# Patient Record
Sex: FEMALE | Race: BLACK OR AFRICAN AMERICAN | NOT HISPANIC OR LATINO | URBAN - METROPOLITAN AREA
[De-identification: names, ages, dates, MRNs, and addresses within clinical notes are randomized per-mention and may not be internally consistent; named-entity substitution may affect disease eponyms.]

---

## 2020-08-25 ENCOUNTER — OFFICE VISIT (OUTPATIENT)
Dept: URBAN - METROPOLITAN AREA CLINIC 60 | Facility: CLINIC | Age: 50
End: 2020-08-25

## 2020-10-27 ENCOUNTER — OFFICE VISIT (OUTPATIENT)
Dept: URBAN - METROPOLITAN AREA CLINIC 121 | Facility: CLINIC | Age: 50
End: 2020-10-27

## 2020-11-05 ENCOUNTER — OFFICE VISIT (OUTPATIENT)
Dept: URBAN - METROPOLITAN AREA CLINIC 60 | Facility: CLINIC | Age: 50
End: 2020-11-05

## 2022-01-13 ENCOUNTER — OFFICE VISIT (OUTPATIENT)
Dept: URBAN - METROPOLITAN AREA CLINIC 63 | Facility: CLINIC | Age: 52
End: 2022-01-13

## 2022-02-08 ENCOUNTER — RX ONLY (OUTPATIENT)
Age: 52
Setting detail: RX ONLY
End: 2022-02-08

## 2022-02-08 RX ORDER — HYDROCORTISONE 25 MG/G
1 CREAM TOPICAL BID
Qty: 20 | Refills: 0 | Status: ERX | COMMUNITY
Start: 2022-02-08

## 2022-03-10 ENCOUNTER — APPOINTMENT (RX ONLY)
Dept: URBAN - METROPOLITAN AREA CLINIC 333 | Facility: CLINIC | Age: 52
Setting detail: DERMATOLOGY
End: 2022-03-10

## 2022-03-10 DIAGNOSIS — Z41.9 ENCOUNTER FOR PROCEDURE FOR PURPOSES OTHER THAN REMEDYING HEALTH STATE, UNSPECIFIED: ICD-10-CM

## 2022-03-10 PROCEDURE — ? VI PEEL

## 2022-03-10 ASSESSMENT — LOCATION ZONE DERM: LOCATION ZONE: FACE

## 2022-03-10 ASSESSMENT — LOCATION DETAILED DESCRIPTION DERM: LOCATION DETAILED: LEFT INFERIOR CENTRAL MALAR CHEEK

## 2022-03-10 ASSESSMENT — LOCATION SIMPLE DESCRIPTION DERM: LOCATION SIMPLE: LEFT CHEEK

## 2022-03-10 NOTE — PROCEDURE: VI PEEL
Detail Level: Zone
Chemical Peel: VI Peel Precision Plus
Prep: The treated area was degreased with pre-peel cleanser.
Post-Care Instructions: I reviewed with the patient in detail post-care instructions. Patient should avoid sun exposure and wear sun protection.
Post Peel Care: After the procedure, the patient was instructed not to apply anything to the skin for 4 hours. Written post op instructions given to pt to follow throughout the healing process. Pt to use Post Tx Repair Cream 4 hours post procedure, then BID and PRN throughout the day. Pt counseled on importance of SPF reapplication. Pt to resume normal skin care regimen in 7-10 days.
Price (Use Numbers Only, No Special Characters Or $): 320
Treatment Number: 2
Comments: 2/3
Consent: Prior to the procedure, written consent was obtained and risks were reviewed, including but not limited to: redness, peeling, blistering, pigmentary change, scarring, infection, and pain. Patient is aware multiple treatments may be necessary to achieve the desired outcome.

## 2022-04-13 ENCOUNTER — APPOINTMENT (RX ONLY)
Dept: URBAN - METROPOLITAN AREA CLINIC 328 | Facility: CLINIC | Age: 52
Setting detail: DERMATOLOGY
End: 2022-04-13

## 2022-04-13 DIAGNOSIS — Z41.9 ENCOUNTER FOR PROCEDURE FOR PURPOSES OTHER THAN REMEDYING HEALTH STATE, UNSPECIFIED: ICD-10-CM

## 2022-04-13 PROCEDURE — ? VI PEEL

## 2022-04-13 NOTE — PROCEDURE: VI PEEL
Post-Care Instructions: Day One: Do not put anything on your skin for 4 hours (including sunscreen). \\n 4 hours after VI Peel (At _____________), apply Post Treatment Repair (in the post peel kit) over the peeled areas. \\nApply the SPF 50 over the Post Treatment Repair.\\n\\nNight One: 1 hour before bedtime\\n    Step 1: Cleanse skin with half of the  Vi Derm Gentle Cleanser. Avoid hot water. Gently pat skin dry.\\n    Step 2: Apply Precision Plus Towelette (White Towelette) to all areas of the skin. Use firm pressure.  DO NOT WASH OFF\\n    Step 3: Wait 30 minutes then apply Post Peel Towelette (Green Towelette) to all areas of the skin. Use firm pressure. DO NOT WASH OFF\\n    Step 4: Wait 10 min and then apply a thin layer of the Post Treatment Repair. \\n    Step 5: You are now ready for bed. Sweet Dreams!\\n      \\nDay Two: \\n    Step 1: Cleanse your skin with the other half of the Vi Derm Gentle Cleanser. Avoid hot water. Gently pat skin dry.\\n    Step 2: Apply a thin layer of Post Treatment Repair. Reapply throughout the day. (late morning, lunch and mid evening)\\n    Step 3: Apply Vi Derm SPF 50+ sunscreen. Reapply every couple of hours. Even if it is not henry outside!                                                                                                                                                  \\n\\nNight Two: 1 hour before bedtime\\n    Step 1: Cleanse your skin with half of the Vi Derm Gentle cleanser. Avoid hot water. Gently pat skin dry.\\n    Step 2:  Apply Precision Peel Towelette (White Towelette) to all areas of skin. DO NOT WASH OFF\\n    Step 3: Wait 30 minutes, then apply the Post Peel Towelette (Green Towelette).  DO NOT WASH OFF\\n    Step 4: Wait 10 min and then apply a thin layer of the Post Treatment Repair. \\n    Step 5: You are now ready for bed. Sweet Dreams!\\n        \\nDay Three:\\n    Today is when the peeling phase starts usually around the mouth area peeling outwards.\\n     Step 1: Cleanse with half of the Vi Derm gentle cleanser.\\n     Step 2: Apply thin layer of post treatment repair at least 3 times per day , more often if needed.\\n     Step 3: Apply  your Vi Derm SPF 50 and try to avoid the sun during the peeling phase. Reapply every 2 hours.\\n\\nDay Four - Seven\\n     You will wash your face morning and night with a gentle cleanser.\\n     You will continue using the Post Treatment Repair until you run out or until you come back to see me for your next appointment.\\n     Remember to wear Vi Derm SPF50 every day and you can reapply with our SPF Brush.\\n     You can wear make-up throughout the peeling process if you desire.\\n     After all peeling is complete you may resume your normal skin care regimen.\\n\\nPlease note the following:\\n· Never peel or force the skin off, you will scar.  Your skin will naturally slough off when washed.  You may take small scissors and trim the peeling skin.  Please be careful when doing so.\\n· If at anytime the Post Treatment Repair or sunscreen or a combination of both irritate your skin stop using them immediately and call our office at [phone]. Post-Care Instructions: Day One: Do not put anything on your skin for 4 hours (including sunscreen). \\n 4 hours after VI Peel (At _____________), apply Post Treatment Repair (in the post peel kit) over the peeled areas. \\nApply the SPF 50 over the Post Treatment Repair.\\n\\nNight One: 1 hour before bedtime\\n    Step 1: Cleanse skin with half of the  Vi Derm Gentle Cleanser. Avoid hot water. Gently pat skin dry.\\n    Step 2: Apply Precision Plus Towelette (White Towelette) to all areas of the skin. Use firm pressure.  DO NOT WASH OFF\\n    Step 3: Wait 30 minutes then apply Post Peel Towelette (Green Towelette) to all areas of the skin. Use firm pressure. DO NOT WASH OFF\\n    Step 4: Wait 10 min and then apply a thin layer of the Post Treatment Repair. \\n    Step 5: You are now ready for bed. Sweet Dreams!\\n      \\nDay Two: \\n    Step 1: Cleanse your skin with the other half of the Vi Derm Gentle Cleanser. Avoid hot water. Gently pat skin dry.\\n    Step 2: Apply a thin layer of Post Treatment Repair. Reapply throughout the day. (late morning, lunch and mid evening)\\n    Step 3: Apply Vi Derm SPF 50+ sunscreen. Reapply every couple of hours. Even if it is not henry outside!                                                                                                                                                  \\n\\nNight Two: 1 hour before bedtime\\n    Step 1: Cleanse your skin with half of the Vi Derm Gentle cleanser. Avoid hot water. Gently pat skin dry.\\n    Step 2:  Apply Precision Peel Towelette (White Towelette) to all areas of skin. DO NOT WASH OFF\\n    Step 3: Wait 30 minutes, then apply the Post Peel Towelette (Green Towelette).  DO NOT WASH OFF\\n    Step 4: Wait 10 min and then apply a thin layer of the Post Treatment Repair. \\n    Step 5: You are now ready for bed. Sweet Dreams!\\n        \\nDay Three:\\n    Today is when the peeling phase starts usually around the mouth area peeling outwards.\\n     Step 1: Cleanse with half of the Vi Derm gentle cleanser.\\n     Step 2: Apply thin layer of post treatment repair at least 3 times per day , more often if needed.\\n     Step 3: Apply  your Vi Derm SPF 50 and try to avoid the sun during the peeling phase. Reapply every 2 hours.\\n\\nDay Four - Seven\\n     You will wash your face morning and night with a gentle cleanser.\\n     You will continue using the Post Treatment Repair until you run out or until you come back to see me for your next appointment.\\n     Remember to wear Vi Derm SPF50 every day and you can reapply with our SPF Brush.\\n     You can wear make-up throughout the peeling process if you desire.\\n     After all peeling is complete you may resume your normal skin care regimen.\\n\\nPlease note the following:\\n· Never peel or force the skin off, you will scar.  Your skin will naturally slough off when washed.  You may take small scissors and trim the peeling skin.  Please be careful when doing so.\\n· If at anytime the Post Treatment Repair or sunscreen or a combination of both irritate your skin stop using them immediately and call our office at [phone].

## 2022-06-16 ENCOUNTER — OFFICE VISIT (OUTPATIENT)
Dept: URBAN - METROPOLITAN AREA CLINIC 63 | Facility: CLINIC | Age: 52
End: 2022-06-16

## 2022-07-09 ENCOUNTER — TELEPHONE ENCOUNTER (OUTPATIENT)
Dept: URBAN - METROPOLITAN AREA CLINIC 121 | Facility: CLINIC | Age: 52
End: 2022-07-09

## 2022-07-09 RX ORDER — PANTOPRAZOLE SODIUM 40 MG/1
TABLET, DELAYED RELEASE ORAL TWICE A DAY
Refills: 0 | OUTPATIENT
Start: 2019-12-26 | End: 2019-12-26

## 2022-07-09 RX ORDER — DEXLANSOPRAZOLE 60 MG/1
CAPSULE, DELAYED RELEASE ORAL TWICE A DAY
Refills: 0 | OUTPATIENT
Start: 2019-12-26 | End: 2019-12-26

## 2022-07-09 RX ORDER — IBUPROFEN 800 MG/1
TABLET, FILM COATED ORAL
Refills: 0 | OUTPATIENT
Start: 2022-05-04 | End: 2022-06-16

## 2022-07-09 RX ORDER — OMEPRAZOLE 40 MG/1
CAPSULE, DELAYED RELEASE ORAL TWICE A DAY
Refills: 0 | OUTPATIENT
Start: 2019-12-26 | End: 2019-12-26

## 2022-07-10 ENCOUNTER — TELEPHONE ENCOUNTER (OUTPATIENT)
Dept: URBAN - METROPOLITAN AREA CLINIC 121 | Facility: CLINIC | Age: 52
End: 2022-07-10

## 2022-07-10 RX ORDER — FERROUS SULFATE 325(65) MG
TABLET ORAL ONCE A DAY
Refills: 0 | Status: ACTIVE | COMMUNITY
Start: 2022-06-16

## 2022-08-01 ENCOUNTER — CLAIMS CREATED FROM THE CLAIM WINDOW (OUTPATIENT)
Dept: URBAN - METROPOLITAN AREA SURGERY CENTER 4 | Facility: SURGERY CENTER | Age: 52
End: 2022-08-01
Payer: COMMERCIAL

## 2022-08-01 DIAGNOSIS — K29.70 GASTRITIS: ICD-10-CM

## 2022-08-01 DIAGNOSIS — R07.89 CHEST PAIN, NON-CARDIAC: ICD-10-CM

## 2022-08-01 DIAGNOSIS — R13.10 DIFFICULTY SWALLOWING: ICD-10-CM

## 2022-08-01 PROCEDURE — G8918 PT W/O PREOP ORDER IV AB PRO: HCPCS | Performed by: INTERNAL MEDICINE

## 2022-08-01 PROCEDURE — 43450 DILATE ESOPHAGUS 1/MULT PASS: CPT | Performed by: INTERNAL MEDICINE

## 2022-08-01 PROCEDURE — 43239 EGD BIOPSY SINGLE/MULTIPLE: CPT | Performed by: INTERNAL MEDICINE

## 2022-08-01 PROCEDURE — G8907 PT DOC NO EVENTS ON DISCHARG: HCPCS | Performed by: INTERNAL MEDICINE

## 2022-08-01 RX ORDER — FERROUS SULFATE 325(65) MG
TABLET ORAL ONCE A DAY
Refills: 0 | Status: ACTIVE | COMMUNITY
Start: 2022-06-16

## 2022-08-05 ENCOUNTER — TELEPHONE ENCOUNTER (OUTPATIENT)
Dept: URBAN - METROPOLITAN AREA CLINIC 63 | Facility: CLINIC | Age: 52
End: 2022-08-05

## 2022-08-09 PROBLEM — 4556007 GASTRITIS: Status: ACTIVE | Noted: 2022-08-09

## 2022-08-09 PROBLEM — 288939007 DIFFICULTY SWALLOWING: Status: ACTIVE | Noted: 2022-07-25

## 2022-08-10 ENCOUNTER — OFFICE VISIT (OUTPATIENT)
Dept: URBAN - METROPOLITAN AREA CLINIC 63 | Facility: CLINIC | Age: 52
End: 2022-08-10
Payer: COMMERCIAL

## 2022-08-10 VITALS
HEART RATE: 68 BPM | OXYGEN SATURATION: 98 % | WEIGHT: 125 LBS | BODY MASS INDEX: 20.83 KG/M2 | DIASTOLIC BLOOD PRESSURE: 62 MMHG | TEMPERATURE: 97.9 F | SYSTOLIC BLOOD PRESSURE: 100 MMHG | RESPIRATION RATE: 16 BRPM | HEIGHT: 65 IN

## 2022-08-10 DIAGNOSIS — K29.50 CHRONIC GASTRITIS WITHOUT BLEEDING, UNSPECIFIED GASTRITIS TYPE: ICD-10-CM

## 2022-08-10 PROBLEM — 8493009: Status: ACTIVE | Noted: 2022-08-10

## 2022-08-10 PROCEDURE — 99213 OFFICE O/P EST LOW 20 MIN: CPT | Performed by: INTERNAL MEDICINE

## 2022-08-10 RX ORDER — IBUPROFEN SODIUM 256 MG/1
1 TABLET WITH FOOD OR MILK AS NEEDED TABLET, COATED ORAL THREE TIMES A DAY
Status: ACTIVE | COMMUNITY

## 2022-08-10 RX ORDER — FERROUS SULFATE 325(65) MG
TABLET ORAL ONCE A DAY
Refills: 0 | Status: ACTIVE | COMMUNITY
Start: 2022-06-16

## 2022-08-10 RX ORDER — FAMOTIDINE 20 MG/1
1 TABLET AT BEDTIME AS NEEDED TABLET, FILM COATED ORAL ONCE A DAY
Status: ACTIVE | COMMUNITY

## 2022-08-10 RX ORDER — PANTOPRAZOLE SODIUM 20 MG/1
1 TABLET TABLET, DELAYED RELEASE ORAL ONCE A DAY
Qty: 30 | Refills: 3 | OUTPATIENT
Start: 2022-08-10

## 2022-08-10 NOTE — HPI-TODAY'S VISIT:
patient was having dyspepsia. recent egd showed gastritis. esophagus was normal. no h pylori. discussed treatment options. she will try pantoprazole. at this time no further gi w/u needed.

## 2022-08-11 ENCOUNTER — OFFICE VISIT (OUTPATIENT)
Dept: URBAN - METROPOLITAN AREA CLINIC 63 | Facility: CLINIC | Age: 52
End: 2022-08-11

## 2022-08-18 ENCOUNTER — OFFICE VISIT (OUTPATIENT)
Dept: URBAN - METROPOLITAN AREA CLINIC 63 | Facility: CLINIC | Age: 52
End: 2022-08-18

## 2022-11-04 ENCOUNTER — TELEPHONE ENCOUNTER (OUTPATIENT)
Dept: URBAN - METROPOLITAN AREA CLINIC 63 | Facility: CLINIC | Age: 52
End: 2022-11-04

## 2023-08-03 ENCOUNTER — APPOINTMENT (RX ONLY)
Dept: URBAN - METROPOLITAN AREA CLINIC 148 | Facility: CLINIC | Age: 53
Setting detail: DERMATOLOGY
End: 2023-08-03

## 2023-08-03 DIAGNOSIS — D22 MELANOCYTIC NEVI: ICD-10-CM

## 2023-08-03 DIAGNOSIS — L81.1 CHLOASMA: ICD-10-CM

## 2023-08-03 DIAGNOSIS — D18.0 HEMANGIOMA: ICD-10-CM

## 2023-08-03 PROBLEM — D18.01 HEMANGIOMA OF SKIN AND SUBCUTANEOUS TISSUE: Status: ACTIVE | Noted: 2023-08-03

## 2023-08-03 PROBLEM — D22.72 MELANOCYTIC NEVI OF LEFT LOWER LIMB, INCLUDING HIP: Status: ACTIVE | Noted: 2023-08-03

## 2023-08-03 PROBLEM — D22.5 MELANOCYTIC NEVI OF TRUNK: Status: ACTIVE | Noted: 2023-08-03

## 2023-08-03 PROCEDURE — 99213 OFFICE O/P EST LOW 20 MIN: CPT

## 2023-08-03 PROCEDURE — ? COUNSELING

## 2023-08-03 PROCEDURE — ? SUNSCREEN RECOMMENDATIONS

## 2023-08-03 PROCEDURE — ? ADDITIONAL NOTES

## 2023-08-03 PROCEDURE — ? PRESCRIPTION

## 2023-08-03 RX ORDER — TRETIONIN 1 MG/G
CREAM TOPICAL
Qty: 45 | Refills: 11 | Status: ERX | COMMUNITY
Start: 2023-08-03

## 2023-08-03 RX ADMIN — TRETIONIN: 1 CREAM TOPICAL at 00:00

## 2023-08-03 ASSESSMENT — LOCATION DETAILED DESCRIPTION DERM
LOCATION DETAILED: LEFT ANTERIOR PROXIMAL THIGH
LOCATION DETAILED: PERIUMBILICAL SKIN
LOCATION DETAILED: RIGHT SUPERIOR MEDIAL UPPER BACK
LOCATION DETAILED: MIDDLE STERNUM
LOCATION DETAILED: RIGHT INFERIOR MEDIAL FOREHEAD
LOCATION DETAILED: LEFT INFERIOR CENTRAL MALAR CHEEK

## 2023-08-03 ASSESSMENT — LOCATION SIMPLE DESCRIPTION DERM
LOCATION SIMPLE: ABDOMEN
LOCATION SIMPLE: LEFT THIGH
LOCATION SIMPLE: RIGHT UPPER BACK
LOCATION SIMPLE: LEFT CHEEK
LOCATION SIMPLE: RIGHT FOREHEAD
LOCATION SIMPLE: CHEST

## 2023-08-03 ASSESSMENT — LOCATION ZONE DERM
LOCATION ZONE: FACE
LOCATION ZONE: TRUNK
LOCATION ZONE: LEG

## 2023-08-03 NOTE — PROCEDURE: SUNSCREEN RECOMMENDATIONS
Detail Level: Generalized
Products Recommended: recommended Neutragena Baby Faces stick with titanium dioxide for the face and Vanicream products of the body
General Sunscreen Counseling: I recommended a broad spectrum sunscreen with a SPF of 30 or higher.  I explained that SPF 30 sunscreens block approximately 97 percent of the sun's harmful rays.  Sunscreens should be applied at least 15 minutes prior to expected sun exposure and then every 2 hours after that as long as sun exposure continues. If swimming or exercising sunscreen should be reapplied every 45 minutes to an hour after getting wet or sweating.  One ounce, or the equivalent of a shot glass full of sunscreen, is adequate to protect the skin not covered by a bathing suit. I also recommended a lip balm with a sunscreen as well. Sun protective clothing can be used in lieu of sunscreen but must be worn the entire time you are exposed to the sun's rays.

## 2023-08-03 NOTE — PROCEDURE: ADDITIONAL NOTES
Detail Level: Zone
Render Risk Assessment In Note?: yes
Additional Notes: Gave pt samples of elta Md clear and UV daily vrm \\nPt also download susie to accumulate points

## 2023-10-03 ENCOUNTER — TELEPHONE ENCOUNTER (OUTPATIENT)
Dept: URBAN - METROPOLITAN AREA CLINIC 7 | Facility: CLINIC | Age: 53
End: 2023-10-03

## 2023-10-24 ENCOUNTER — CLAIMS CREATED FROM THE CLAIM WINDOW (OUTPATIENT)
Dept: URBAN - METROPOLITAN AREA CLINIC 7 | Facility: CLINIC | Age: 53
End: 2023-10-24
Payer: COMMERCIAL

## 2023-10-24 ENCOUNTER — LAB OUTSIDE AN ENCOUNTER (OUTPATIENT)
Dept: URBAN - METROPOLITAN AREA CLINIC 7 | Facility: CLINIC | Age: 53
End: 2023-10-24

## 2023-10-24 VITALS
TEMPERATURE: 97.7 F | BODY MASS INDEX: 22.16 KG/M2 | DIASTOLIC BLOOD PRESSURE: 78 MMHG | SYSTOLIC BLOOD PRESSURE: 130 MMHG | WEIGHT: 133 LBS | HEIGHT: 65 IN

## 2023-10-24 DIAGNOSIS — R10.84 GENERALIZED ABDOMINAL PAIN: ICD-10-CM

## 2023-10-24 DIAGNOSIS — K21.9 GERD WITHOUT ESOPHAGITIS: ICD-10-CM

## 2023-10-24 DIAGNOSIS — R10.2 PELVIC AND PERINEAL PAIN: ICD-10-CM

## 2023-10-24 DIAGNOSIS — K76.89 LIVER CYST: ICD-10-CM

## 2023-10-24 DIAGNOSIS — K29.50 CHRONIC GASTRITIS WITHOUT BLEEDING, UNSPECIFIED GASTRITIS TYPE: ICD-10-CM

## 2023-10-24 PROBLEM — 266435005: Status: ACTIVE | Noted: 2023-10-24

## 2023-10-24 PROBLEM — 102614006: Status: ACTIVE | Noted: 2023-10-24

## 2023-10-24 PROBLEM — 85057007: Status: ACTIVE | Noted: 2023-10-24

## 2023-10-24 PROCEDURE — 99214 OFFICE O/P EST MOD 30 MIN: CPT | Performed by: INTERNAL MEDICINE

## 2023-10-24 PROCEDURE — 99204 OFFICE O/P NEW MOD 45 MIN: CPT | Performed by: INTERNAL MEDICINE

## 2023-10-24 RX ORDER — PANTOPRAZOLE SODIUM 20 MG/1
1 TABLET TABLET, DELAYED RELEASE ORAL ONCE A DAY
Qty: 30 | Refills: 3 | Status: ACTIVE | COMMUNITY
Start: 2022-08-10

## 2023-10-24 RX ORDER — CELECOXIB 50 MG/1
1 CAPSULE CAPSULE ORAL ONCE A DAY
Status: ACTIVE | COMMUNITY

## 2023-10-24 RX ORDER — IBUPROFEN SODIUM 256 MG/1
1 TABLET WITH FOOD OR MILK AS NEEDED TABLET, COATED ORAL THREE TIMES A DAY
Status: ACTIVE | COMMUNITY

## 2023-10-24 NOTE — HPI-TODAY'S VISIT:
Patient is seen today with a complaint of  recurrent intermittent abdominal  pain. Associated with dyspepsia and GERD. The pain is of  months duration. The pain is described as sharp. It is localized to the  epigastrium/upper abd. S/p cholecystectomy 2020 but no improvement in sxs post surgery. It is non radiating  Exacerbating factors - eating Unrelated to change in position ,activity. Relieving factors - none. Severity mild to moderate. Not associated with nausea,vomiting  Has noted early satiety and dyspepsia as well as intermittent bloating.. No recent fevers or chills No weight loss No change in bowel habits. No dark urine or alcoholic stools. No significant NSAID use history.

## 2023-10-31 ENCOUNTER — OFFICE VISIT (OUTPATIENT)
Dept: URBAN - METROPOLITAN AREA SURGERY CENTER 5 | Facility: SURGERY CENTER | Age: 53
End: 2023-10-31
Payer: COMMERCIAL

## 2023-10-31 DIAGNOSIS — K31.89 OTHER DISEASES OF STOMACH AND DUODENUM: ICD-10-CM

## 2023-10-31 DIAGNOSIS — K44.9 DIAPHRAGMATIC HERNIA WITHOUT OBSTRUCTION OR GANGRENE: ICD-10-CM

## 2023-10-31 DIAGNOSIS — R10.13 DYSPEPSIA: ICD-10-CM

## 2023-10-31 PROCEDURE — 43239 EGD BIOPSY SINGLE/MULTIPLE: CPT | Performed by: INTERNAL MEDICINE

## 2023-10-31 PROCEDURE — 00731 ANES UPR GI NDSC PX NOS: CPT | Performed by: NURSE ANESTHETIST, CERTIFIED REGISTERED

## 2023-11-10 ENCOUNTER — LAB OUTSIDE AN ENCOUNTER (OUTPATIENT)
Dept: URBAN - METROPOLITAN AREA CLINIC 7 | Facility: CLINIC | Age: 53
End: 2023-11-10

## 2023-11-17 ENCOUNTER — TELEPHONE ENCOUNTER (OUTPATIENT)
Dept: URBAN - METROPOLITAN AREA CLINIC 7 | Facility: CLINIC | Age: 53
End: 2023-11-17

## 2023-12-26 ENCOUNTER — DASHBOARD ENCOUNTERS (OUTPATIENT)
Age: 53
End: 2023-12-26

## 2023-12-26 ENCOUNTER — OFFICE VISIT (OUTPATIENT)
Dept: URBAN - METROPOLITAN AREA CLINIC 7 | Facility: CLINIC | Age: 53
End: 2023-12-26
Payer: COMMERCIAL

## 2023-12-26 VITALS
HEIGHT: 65 IN | SYSTOLIC BLOOD PRESSURE: 118 MMHG | DIASTOLIC BLOOD PRESSURE: 80 MMHG | BODY MASS INDEX: 22.33 KG/M2 | WEIGHT: 134 LBS

## 2023-12-26 DIAGNOSIS — K21.9 GERD WITHOUT ESOPHAGITIS: ICD-10-CM

## 2023-12-26 DIAGNOSIS — K44.9 HIATAL HERNIA: ICD-10-CM

## 2023-12-26 DIAGNOSIS — K29.50 CHRONIC GASTRITIS WITHOUT BLEEDING, UNSPECIFIED GASTRITIS TYPE: ICD-10-CM

## 2023-12-26 DIAGNOSIS — R10.84 GENERALIZED ABDOMINAL PAIN: ICD-10-CM

## 2023-12-26 PROBLEM — 84089009: Status: ACTIVE | Noted: 2023-12-26

## 2023-12-26 PROCEDURE — 99213 OFFICE O/P EST LOW 20 MIN: CPT | Performed by: INTERNAL MEDICINE

## 2023-12-26 RX ORDER — IBUPROFEN SODIUM 256 MG/1
1 TABLET WITH FOOD OR MILK AS NEEDED TABLET, COATED ORAL THREE TIMES A DAY
Status: ACTIVE | COMMUNITY

## 2023-12-26 RX ORDER — CELECOXIB 50 MG/1
1 CAPSULE CAPSULE ORAL ONCE A DAY
Status: ACTIVE | COMMUNITY

## 2023-12-26 RX ORDER — PANTOPRAZOLE SODIUM 20 MG/1
1 TABLET TABLET, DELAYED RELEASE ORAL ONCE A DAY
Qty: 30 | Refills: 3 | Status: ACTIVE | COMMUNITY
Start: 2022-08-10

## 2023-12-26 NOTE — HPI-TODAY'S VISIT:
Patient is seen today s/p recent negative CT for eval of  ongoing  complaint of  recurrent intermittent abdominal  pain. Had recent EGD without finding. Associated with dyspepsia and GERD. The pain is of  months duration. The pain is described as sharp. It is localized to the  epigastrium/upper abd. S/p cholecystectomy 2020 but no improvement in sxs post surgery. It is non radiating  Exacerbating factors - eating Unrelated to change in position ,activity. Relieving factors - none. Severity mild to moderate. Not associated with nausea,vomiting  Has noted early satiety and dyspepsia as well as intermittent bloating.. No recent fevers or chills No weight loss No change in bowel habits. No dark urine or alcoholic stools. No significant NSAID use history.

## 2025-06-19 ENCOUNTER — APPOINTMENT (OUTPATIENT)
Dept: URBAN - METROPOLITAN AREA CLINIC 148 | Facility: CLINIC | Age: 55
Setting detail: DERMATOLOGY
End: 2025-06-19

## 2025-06-19 DIAGNOSIS — L65.8 OTHER SPECIFIED NONSCARRING HAIR LOSS: ICD-10-CM

## 2025-06-19 DIAGNOSIS — L85.3 XEROSIS CUTIS: ICD-10-CM

## 2025-06-19 DIAGNOSIS — Z41.9 ENCOUNTER FOR PROCEDURE FOR PURPOSES OTHER THAN REMEDYING HEALTH STATE, UNSPECIFIED: ICD-10-CM

## 2025-06-19 DIAGNOSIS — L81.4 OTHER MELANIN HYPERPIGMENTATION: ICD-10-CM

## 2025-06-19 DIAGNOSIS — L82.1 OTHER SEBORRHEIC KERATOSIS: ICD-10-CM

## 2025-06-19 PROCEDURE — ? COUNSELING

## 2025-06-19 PROCEDURE — ? FULL BODY SKIN EXAM

## 2025-06-19 PROCEDURE — ? PRODUCT LINE (ZO SKIN HEALTH)

## 2025-06-19 PROCEDURE — ? SUNSCREEN RECOMMENDATIONS

## 2025-06-19 ASSESSMENT — LOCATION SIMPLE DESCRIPTION DERM
LOCATION SIMPLE: LEFT FOREARM
LOCATION SIMPLE: RIGHT FOREARM
LOCATION SIMPLE: ABDOMEN
LOCATION SIMPLE: SCALP
LOCATION SIMPLE: RIGHT THIGH
LOCATION SIMPLE: RIGHT UPPER BACK
LOCATION SIMPLE: LEFT THIGH

## 2025-06-19 ASSESSMENT — LOCATION ZONE DERM
LOCATION ZONE: TRUNK
LOCATION ZONE: LEG
LOCATION ZONE: SCALP
LOCATION ZONE: ARM

## 2025-06-19 ASSESSMENT — LOCATION DETAILED DESCRIPTION DERM
LOCATION DETAILED: RIGHT ANTERIOR PROXIMAL THIGH
LOCATION DETAILED: EPIGASTRIC SKIN
LOCATION DETAILED: RIGHT PROXIMAL DORSAL FOREARM
LOCATION DETAILED: LEFT ANTERIOR PROXIMAL THIGH
LOCATION DETAILED: LEFT PROXIMAL DORSAL FOREARM
LOCATION DETAILED: RIGHT SUPERIOR UPPER BACK
LOCATION DETAILED: RIGHT SUPERIOR MEDIAL UPPER BACK
LOCATION DETAILED: LEFT SUPERIOR PARIETAL SCALP

## 2025-06-19 ASSESSMENT — SEVERITY ASSESSMENT OVERALL AMONG ALL PATIENTS
IN YOUR EXPERIENCE, AMONG ALL PATIENTS YOU HAVE SEEN WITH THIS CONDITION, HOW SEVERE IS THIS PATIENT'S CONDITION?: S2 (25-49% HAIR LOSS)

## 2025-06-19 NOTE — PROCEDURE: PRODUCT LINE (ZO SKIN HEALTH)
Name Of Product 6: Wrinkle & texture repair
Product 41 Units: 0
Product 33 Application Directions: As Directed
Product 36 Price (In Dollars - Numeric Only, No Special Characters Or $): 0.00
Product 8 Application Directions: Twice a day or as needed
Product 17 Price (In Dollars - Numeric Only, No Special Characters Or $): 124.00
Product 25 Price (In Dollars - Numeric Only, No Special Characters Or $): 108.00
Name Of Product 28: Rozatrol
Name Of Product 20: Pigment Control Blending Crème
Product 31 Price (In Dollars - Numeric Only, No Special Characters Or $): 143.00
Product 6 Price (In Dollars - Numeric Only, No Special Characters Or $): 159.00
Name Of Product 14: Anti Aging program kit
Name Of Product 1: ZO Skin Normalizing Kit
Product 11 Price (In Dollars - Numeric Only, No Special Characters Or $): 46.00
Product 22 Application Directions: PM only
Name Of Product 9: Sunscreen and Primer
Product 20 Price (In Dollars - Numeric Only, No Special Characters Or $): 77.00
Product 28 Price (In Dollars - Numeric Only, No Special Characters Or $): 105.00
Product 6 Units: 1
Product 1 Price (In Dollars - Numeric Only, No Special Characters Or $): 200.00
Product 9 Price (In Dollars - Numeric Only, No Special Characters Or $): 67.00
Name Of Product 15: Bright alive
Name Of Product 23: Eye Brightening Crème
Product 14 Price (In Dollars - Numeric Only, No Special Characters Or $): 240.00
Product 25 Application Directions: AM only
Name Of Product 4: Daily power defense
Detail Level: Zone
Product 11 Application Directions: Twice a day
Product 6 Application Directions: Alternate days
Name Of Product 18: Retinol skin brightner .1
Name Of Product 26: Redness relief and barrier defense program
Product 4 Price (In Dollars - Numeric Only, No Special Characters Or $): 173.00
Product 20 Application Directions: Use PM only Mix with Tretinoin
Name Of Product 12: Daily skincare Program
Product 15 Price (In Dollars - Numeric Only, No Special Characters Or $): 145.00
Name Of Product 32: Complexion Clearing Program
Product 23 Price (In Dollars - Numeric Only, No Special Characters Or $): 130.00
Name Of Product 7: Growth factor
Product 9 Application Directions: Daily
Product 12 Price (In Dollars - Numeric Only, No Special Characters Or $): 140.00
Product 26 Price (In Dollars - Numeric Only, No Special Characters Or $): 220.00
Name Of Product 29: Soothing Mist
Assigning Risk Information: Per AMA, level of risk is based upon consequences of the problem(s) addressed at the encounter when appropriately treated. Risk also includes medical decision making related to the need to initiate or forego further testing, treatment and/or hospitalization. Over the counter medication are assigned a risk level of low. Prescription medication management is assigned a risk level of moderate.
Product 32 Price (In Dollars - Numeric Only, No Special Characters Or $): 150.00
Name Of Product 21: AOX Illuminating serum
Name Of Product 2: Pigment control kit
Name Of Product 10: Exfoliating Polish
Product 15 Application Directions: Am/Pm
Risk Of Complication Category: No MDM
Product 7 Price (In Dollars - Numeric Only, No Special Characters Or $): 168.00
Product 21 Price (In Dollars - Numeric Only, No Special Characters Or $): 178.00
Product 29 Price (In Dollars - Numeric Only, No Special Characters Or $): 68.00
Allow Plan To Count Towards E/M Coding: Yes
Name Of Product 30: Complexion Renewal pads
Name Of Product 24: Skin Brightening Program
Name Of Product 16: Retinols skin Brighter 0.025
Product 12 Application Directions: Use daily 2 times per day, except exfoliating polish use 1 time per day
Name Of Product 5: Hydrating cleanser
Name Of Product 27: Aggressive Anti Aging Kit
Name Of Product 19: Pigment Control Crème
Product 30 Price (In Dollars - Numeric Only, No Special Characters Or $): 59.00
Product 16 Price (In Dollars - Numeric Only, No Special Characters Or $): 110.00
Name Of Product 13: Growth Factor for Eyes
Product 5 Price (In Dollars - Numeric Only, No Special Characters Or $): 49.00
Product 21 Application Directions: Am /pm
Product 29 Application Directions: Use as needed
Name Of Product 33: Complexion clearing Serum
Name Of Product 8: Renewal crème
Product 10 Application Directions: One time per day
Product 27 Price (In Dollars - Numeric Only, No Special Characters Or $): 290.00
Name Of Product 22: Tretinoin .050
Name Of Product 3: Gentle cleanser
Product 8 Price (In Dollars - Numeric Only, No Special Characters Or $): 120.00
Name Of Product 11: Calming toner
Name Of Product 31: Retinol + Acne complex
Product 22 Price (In Dollars - Numeric Only, No Special Characters Or $): 80.00
Name Of Product 25: 10% self activating Vitamin C
Name Of Product 17: Retinol Skin Brightner 0.05

## 2025-08-28 ENCOUNTER — OFFICE VISIT (OUTPATIENT)
Dept: URBAN - METROPOLITAN AREA CLINIC 7 | Facility: CLINIC | Age: 55
End: 2025-08-28
Payer: COMMERCIAL

## 2025-08-28 DIAGNOSIS — K44.9 HIATAL HERNIA: ICD-10-CM

## 2025-08-28 DIAGNOSIS — R10.13 EPIGASTRIC PAIN: ICD-10-CM

## 2025-08-28 DIAGNOSIS — K29.50 CHRONIC GASTRITIS WITHOUT BLEEDING, UNSPECIFIED GASTRITIS TYPE: ICD-10-CM

## 2025-08-28 DIAGNOSIS — Z90.49 S/P CHOLECYSTECTOMY: ICD-10-CM

## 2025-08-28 DIAGNOSIS — K21.9 GERD WITHOUT ESOPHAGITIS: ICD-10-CM

## 2025-08-28 DIAGNOSIS — R10.11 RUQ ABDOMINAL PAIN: ICD-10-CM

## 2025-08-28 PROBLEM — 79922009: Status: ACTIVE | Noted: 2025-08-28

## 2025-08-28 PROBLEM — 301717006: Status: ACTIVE | Noted: 2025-08-28

## 2025-08-28 PROBLEM — 428882003: Status: ACTIVE | Noted: 2025-08-28

## 2025-08-28 PROCEDURE — 99214 OFFICE O/P EST MOD 30 MIN: CPT

## 2025-08-28 RX ORDER — PANTOPRAZOLE SODIUM 20 MG/1
1 TABLET TABLET, DELAYED RELEASE ORAL ONCE A DAY
Qty: 90 | Refills: 2 | Status: DISCONTINUED | COMMUNITY
Start: 2022-08-10

## 2025-08-28 RX ORDER — CELECOXIB 50 MG/1
1 CAPSULE CAPSULE ORAL ONCE A DAY
Status: DISCONTINUED | COMMUNITY

## 2025-08-28 RX ORDER — IBUPROFEN SODIUM 256 MG/1
1 TABLET WITH FOOD OR MILK AS NEEDED TABLET, COATED ORAL THREE TIMES A DAY
Status: DISCONTINUED | COMMUNITY